# Patient Record
Sex: FEMALE | Race: BLACK OR AFRICAN AMERICAN | NOT HISPANIC OR LATINO | URBAN - METROPOLITAN AREA
[De-identification: names, ages, dates, MRNs, and addresses within clinical notes are randomized per-mention and may not be internally consistent; named-entity substitution may affect disease eponyms.]

---

## 2021-11-15 ENCOUNTER — EMERGENCY (EMERGENCY)
Facility: HOSPITAL | Age: 36
LOS: 1 days | Discharge: HOME | End: 2021-11-15
Attending: EMERGENCY MEDICINE | Admitting: EMERGENCY MEDICINE
Payer: COMMERCIAL

## 2021-11-15 VITALS
OXYGEN SATURATION: 100 % | WEIGHT: 149.91 LBS | HEART RATE: 81 BPM | SYSTOLIC BLOOD PRESSURE: 140 MMHG | TEMPERATURE: 98 F | RESPIRATION RATE: 18 BRPM | HEIGHT: 64 IN | DIASTOLIC BLOOD PRESSURE: 96 MMHG

## 2021-11-15 VITALS
RESPIRATION RATE: 16 BRPM | HEART RATE: 79 BPM | TEMPERATURE: 99 F | DIASTOLIC BLOOD PRESSURE: 88 MMHG | OXYGEN SATURATION: 100 % | SYSTOLIC BLOOD PRESSURE: 141 MMHG

## 2021-11-15 DIAGNOSIS — R51.9 HEADACHE, UNSPECIFIED: ICD-10-CM

## 2021-11-15 DIAGNOSIS — R29.810 FACIAL WEAKNESS: ICD-10-CM

## 2021-11-15 DIAGNOSIS — H53.8 OTHER VISUAL DISTURBANCES: ICD-10-CM

## 2021-11-15 DIAGNOSIS — H40.9 UNSPECIFIED GLAUCOMA: ICD-10-CM

## 2021-11-15 DIAGNOSIS — Z86.73 PERSONAL HISTORY OF TRANSIENT ISCHEMIC ATTACK (TIA), AND CEREBRAL INFARCTION WITHOUT RESIDUAL DEFICITS: ICD-10-CM

## 2021-11-15 DIAGNOSIS — R11.2 NAUSEA WITH VOMITING, UNSPECIFIED: ICD-10-CM

## 2021-11-15 DIAGNOSIS — R20.0 ANESTHESIA OF SKIN: ICD-10-CM

## 2021-11-15 LAB
ALBUMIN SERPL ELPH-MCNC: 4.3 G/DL — SIGNIFICANT CHANGE UP (ref 3.3–5)
ALP SERPL-CCNC: 55 U/L — SIGNIFICANT CHANGE UP (ref 40–120)
ALT FLD-CCNC: 18 U/L — SIGNIFICANT CHANGE UP (ref 10–45)
ANION GAP SERPL CALC-SCNC: 11 MMOL/L — SIGNIFICANT CHANGE UP (ref 5–17)
AST SERPL-CCNC: 17 U/L — SIGNIFICANT CHANGE UP (ref 10–40)
BASOPHILS # BLD AUTO: 0.02 K/UL — SIGNIFICANT CHANGE UP (ref 0–0.2)
BASOPHILS NFR BLD AUTO: 0.5 % — SIGNIFICANT CHANGE UP (ref 0–2)
BILIRUB SERPL-MCNC: <0.2 MG/DL — SIGNIFICANT CHANGE UP (ref 0.2–1.2)
BUN SERPL-MCNC: 7 MG/DL — SIGNIFICANT CHANGE UP (ref 7–23)
CALCIUM SERPL-MCNC: 9.1 MG/DL — SIGNIFICANT CHANGE UP (ref 8.4–10.5)
CHLORIDE SERPL-SCNC: 107 MMOL/L — SIGNIFICANT CHANGE UP (ref 96–108)
CO2 SERPL-SCNC: 26 MMOL/L — SIGNIFICANT CHANGE UP (ref 22–31)
CREAT SERPL-MCNC: 0.76 MG/DL — SIGNIFICANT CHANGE UP (ref 0.5–1.3)
EOSINOPHIL # BLD AUTO: 0.05 K/UL — SIGNIFICANT CHANGE UP (ref 0–0.5)
EOSINOPHIL NFR BLD AUTO: 1.2 % — SIGNIFICANT CHANGE UP (ref 0–6)
GLUCOSE SERPL-MCNC: 88 MG/DL — SIGNIFICANT CHANGE UP (ref 70–99)
HCT VFR BLD CALC: 37.9 % — SIGNIFICANT CHANGE UP (ref 34.5–45)
HGB BLD-MCNC: 12.1 G/DL — SIGNIFICANT CHANGE UP (ref 11.5–15.5)
IMM GRANULOCYTES NFR BLD AUTO: 0.2 % — SIGNIFICANT CHANGE UP (ref 0–1.5)
LYMPHOCYTES # BLD AUTO: 1.47 K/UL — SIGNIFICANT CHANGE UP (ref 1–3.3)
LYMPHOCYTES # BLD AUTO: 34.2 % — SIGNIFICANT CHANGE UP (ref 13–44)
MAGNESIUM SERPL-MCNC: 2 MG/DL — SIGNIFICANT CHANGE UP (ref 1.6–2.6)
MCHC RBC-ENTMCNC: 30.2 PG — SIGNIFICANT CHANGE UP (ref 27–34)
MCHC RBC-ENTMCNC: 31.9 GM/DL — LOW (ref 32–36)
MCV RBC AUTO: 94.5 FL — SIGNIFICANT CHANGE UP (ref 80–100)
MONOCYTES # BLD AUTO: 0.25 K/UL — SIGNIFICANT CHANGE UP (ref 0–0.9)
MONOCYTES NFR BLD AUTO: 5.8 % — SIGNIFICANT CHANGE UP (ref 2–14)
NEUTROPHILS # BLD AUTO: 2.5 K/UL — SIGNIFICANT CHANGE UP (ref 1.8–7.4)
NEUTROPHILS NFR BLD AUTO: 58.1 % — SIGNIFICANT CHANGE UP (ref 43–77)
NRBC # BLD: 0 /100 WBCS — SIGNIFICANT CHANGE UP (ref 0–0)
PLATELET # BLD AUTO: 220 K/UL — SIGNIFICANT CHANGE UP (ref 150–400)
POTASSIUM SERPL-MCNC: 3.8 MMOL/L — SIGNIFICANT CHANGE UP (ref 3.5–5.3)
POTASSIUM SERPL-SCNC: 3.8 MMOL/L — SIGNIFICANT CHANGE UP (ref 3.5–5.3)
PROT SERPL-MCNC: 7.5 G/DL — SIGNIFICANT CHANGE UP (ref 6–8.3)
RBC # BLD: 4.01 M/UL — SIGNIFICANT CHANGE UP (ref 3.8–5.2)
RBC # FLD: 12.9 % — SIGNIFICANT CHANGE UP (ref 10.3–14.5)
SODIUM SERPL-SCNC: 144 MMOL/L — SIGNIFICANT CHANGE UP (ref 135–145)
WBC # BLD: 4.3 K/UL — SIGNIFICANT CHANGE UP (ref 3.8–10.5)
WBC # FLD AUTO: 4.3 K/UL — SIGNIFICANT CHANGE UP (ref 3.8–10.5)

## 2021-11-15 PROCEDURE — 99284 EMERGENCY DEPT VISIT MOD MDM: CPT | Mod: 25

## 2021-11-15 PROCEDURE — 85025 COMPLETE CBC W/AUTO DIFF WBC: CPT

## 2021-11-15 PROCEDURE — 99284 EMERGENCY DEPT VISIT MOD MDM: CPT | Mod: GC

## 2021-11-15 PROCEDURE — 70450 CT HEAD/BRAIN W/O DYE: CPT | Mod: 26,MA

## 2021-11-15 PROCEDURE — 82962 GLUCOSE BLOOD TEST: CPT

## 2021-11-15 PROCEDURE — 83735 ASSAY OF MAGNESIUM: CPT

## 2021-11-15 PROCEDURE — 96374 THER/PROPH/DIAG INJ IV PUSH: CPT

## 2021-11-15 PROCEDURE — 36415 COLL VENOUS BLD VENIPUNCTURE: CPT

## 2021-11-15 PROCEDURE — 99285 EMERGENCY DEPT VISIT HI MDM: CPT

## 2021-11-15 PROCEDURE — 70450 CT HEAD/BRAIN W/O DYE: CPT | Mod: MA

## 2021-11-15 PROCEDURE — 80053 COMPREHEN METABOLIC PANEL: CPT

## 2021-11-15 RX ORDER — KETOROLAC TROMETHAMINE 30 MG/ML
15 SYRINGE (ML) INJECTION ONCE
Refills: 0 | Status: DISCONTINUED | OUTPATIENT
Start: 2021-11-15 | End: 2021-11-15

## 2021-11-15 RX ORDER — KETOROLAC TROMETHAMINE 30 MG/ML
1 SYRINGE (ML) INJECTION
Qty: 20 | Refills: 0
Start: 2021-11-15 | End: 2021-11-19

## 2021-11-15 RX ADMIN — Medication 15 MILLIGRAM(S): at 17:07

## 2021-11-15 NOTE — ED ADULT TRIAGE NOTE - CHIEF COMPLAINT QUOTE
Pt co 40 min of numbness, tingling and R sided facial droop resolving without intervention. Hx of TIA.

## 2021-11-15 NOTE — ED PROVIDER NOTE - OBJECTIVE STATEMENT
37 y/o F PMHx multiple episodes of ?TIA, followed by a neurologist in [place]. Pt had a negative CT head, MR brain, and cerebral angiogram. Describes having an episode of blurry vision to the left eye, drooping to the left side of the face, numbness to the left hand and face, left sided headache that is described as dull and pressure like, and decreased movement of the right side of the mouth that started at 8:30AM today while sitting at work. Reports associated nausea and vomiting. Her symptoms lasted 40 minutes and resolved spontaneously. States Hx similar symptoms with no nausea and vomiting. Pt is currently asymptomatic and denies experiencing focal weakness, slurred speech, gait difficulties, chest pain, and SOB. States that prior her symptoms seemed to be related when she would get her menses, and had an improvement of symptoms after removal of NuvaRing.

## 2021-11-15 NOTE — ED PROVIDER NOTE - PATIENT PORTAL LINK FT
You can access the FollowMyHealth Patient Portal offered by [facility] by registering at the following website: [email or web address]. By joining [facility]’s FollowMyHealth portal, you will also be able to view your health information using other applications (apps) compatible with our system.

## 2021-11-15 NOTE — ED ADULT NURSE NOTE - OBJECTIVE STATEMENT
35yo female c/o facial droop, dizziness, blurry vision , nausea which started at 8am this morning and resolved by time she came to the ED. PT has HX: TIA, A&OX4, RT 18 gauge ACF iv access by EmS. PT currently denies any CP/SOB/DIZZINESS.

## 2021-11-15 NOTE — CONSULT NOTE ADULT - ASSESSMENT
37 y/o F PMHx multiple episodes of ?TIA hx of migraines. prior workup in [place] by multiple neurologist. 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 patient also describes being told about possible conversion disorder. symptoms near complete resolution at this time. patient describes diminished sensation of the L side however all other symptoms have self resolved CT head unremarkable. leading diagnosis likely migraines w complicated features (possible psychosomatic overlie     -would recommend discharge with follow up with Dr Oviedo outpatient after MR head and MR angiogram.  -toradol 15-30mg PRN for HA 35 y/o F PMHx multiple episodes of ?TIA hx of migraines. prior workup in [place] by multiple neurologist. 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 patient also describes being told about possible conversion disorder. symptoms near complete resolution at this time. patient describes diminished sensation of the L side however all other symptoms have self resolved, including nausea, aside from the headache, at 4/10 nearing discharge.   CT head unremarkable.  Leading diagnosis likely migraines w complicated features (possible somatic overlay).       -would recommend discharge with follow up with Dr Coates outpatient after MR head and MR angiogram.  -toradol 15-30mg PRN for HA

## 2021-11-15 NOTE — CONSULT NOTE ADULT - SUBJECTIVE AND OBJECTIVE BOX
Neurology consult    ALEJANDRA [id number]    HPI:37 y/o F PMHx multiple episodes of ?TIA, followed by a neurologist in [place]. Pt had a negative CT head, MR brain, and cerebral angiogram. Describes having an episode of blurry vision to the left eye, drooping to the left side of the face, numbness to the left hand and face, left sided headache that is described as dull and pressure like, and decreased movement of the right side of the mouth that started at 8:30AM today while sitting at work. Reports associated nausea and vomiting. Her symptoms lasted 40 minutes and resolved spontaneously. States Hx similar symptoms with no nausea and vomiting. Pt is currently asymptomatic and denies experiencing focal weakness, slurred speech, gait difficulties, chest pain, and SOB. States that prior her symptoms seemed to be related when she would get her menses, and had an improvement of symptoms after removal of NuvaRing.    Hx of Glaucoma on Latanoprost .005% 1 drop b/l at bedtime           MEDICATIONS    Latanoprost .005% 1 drop b/l at bedtime           Family history: No history of dementia, strokes, or seizures   FAMILY HISTORY:    SOCIAL HISTORY -- No history of tobacco or alcohol use     Allergies    No Known Allergies    Intolerances        Height (cm): 162.6 (11-15 @ 13:06)  Weight (kg): 68 (11-15 @ 13:06)  BMI (kg/m2): 25.7 (11-15 @ 13:06)    Vital Signs Last 24 Hrs  T(C): 37.1 (15 Nov 2021 17:12), Max: 37.1 (15 Nov 2021 17:12)  T(F): 98.7 (15 Nov 2021 17:12), Max: 98.7 (15 Nov 2021 17:12)  HR: 79 (15 Nov 2021 17:12) (79 - 81)  BP: 141/88 (15 Nov 2021 17:12) (140/96 - 141/88)  BP(mean): --  RR: 16 (15 Nov 2021 17:12) (16 - 18)  SpO2: 100% (15 Nov 2021 17:12) (100% - 100%)      REVIEW OF SYSTEMS:    Constitutional: No fever, chills, fatigue, weakness  Eyes: no eye pain, visual disturbances, or discharge  ENT:  No difficulty hearing, tinnitus, vertigo; No sinus or throat pain  Neck: No pain or stiffness  Respiratory: No cough, dyspnea, wheezing   Cardiovascular: No chest pain, palpitations,   Gastrointestinal: No abdominal or epigastric pain. No nausea, vomiting  No diarrhea or constipation.   Genitourinary: No dysuria, frequency, hematuria or incontinence  Neurological: No headaches, lightheadedness, vertigo, numbness or tremors  Psychiatric: No depression, anxiety, mood swings or difficulty sleeping  Musculoskeletal: No joint pain or swelling; No muscle, back or extremity pain  Skin: No itching, burning, rashes or lesions   Lymph Nodes: No enlarged glands  Endocrine: No heat or cold intolerance; No hair loss, No h/o diabetes or thyroid dysfunction  Allergy and Immunologic: No hives or eczema    On Neurological Examination:    Mental Status - Patient is alert, awake, oriented X3. Confused Dementia Lethargic .     Follows commands well and able to answer questions appropriately. Mood and affect  normal  Follow complex commands      Speech -   Fluent                       Cranial Nerves - Pupils 3 mm equal and reactive to light,   extraocular eye movements intact.     Motor Exam - 5/5  Right upper  Left upper  Right lower  Left lower     With stimuli positive movement of all 4 extremities    Muscle tone - is normal all over. No asymmetry is seen.      Sensory    diminished to light touch on L arm, leg and L face v1-v3 distribution    Gait -  normal  ataxia     GENERAL Exam:     Nontoxic , No Acute Distress   	  HEENT:  normocephalic, atraumatic    EXTREMITIES:   No Edema  No Clubbing  No Cyanosis No Edema    MUSCULOSKELETAL: Normal Range of Motion  	   SKIN:      Normal   No Ecchymosis               LABS:  CBC Full  -  ( 15 Nov 2021 13:43 )  WBC Count : 4.30 K/uL  RBC Count : 4.01 M/uL  Hemoglobin : 12.1 g/dL  Hematocrit : 37.9 %  Platelet Count - Automated : 220 K/uL  Mean Cell Volume : 94.5 fl  Mean Cell Hemoglobin : 30.2 pg  Mean Cell Hemoglobin Concentration : 31.9 gm/dL  Auto Neutrophil # : 2.50 K/uL  Auto Lymphocyte # : 1.47 K/uL  Auto Monocyte # : 0.25 K/uL  Auto Eosinophil # : 0.05 K/uL  Auto Basophil # : 0.02 K/uL  Auto Neutrophil % : 58.1 %  Auto Lymphocyte % : 34.2 %  Auto Monocyte % : 5.8 %  Auto Eosinophil % : 1.2 %  Auto Basophil % : 0.5 %      11-15    144  |  107  |  7   ----------------------------<  88  3.8   |  26  |  0.76    Ca    9.1      15 Nov 2021 13:43  Mg     2.0     11-15    TPro  7.5  /  Alb  4.3  /  TBili  <0.2  /  DBili  x   /  AST  17  /  ALT  18  /  AlkPhos  55  11-15    Hemoglobin A1C:     LIVER FUNCTIONS - ( 15 Nov 2021 13:43 )  Alb: 4.3 g/dL / Pro: 7.5 g/dL / ALK PHOS: 55 U/L / ALT: 18 U/L / AST: 17 U/L / GGT: x           Vitamin B12         RADIOLOGY    EKG                     Neurology consult    ALEJANDRA [id number]    HPI:35 y/o F PMHx multiple episodes of ?TIA, followed by a neurologist in [place]. Pt had a negative CT head, MR brain, and cerebral angiogram. Describes having an episode of blurry vision to the left eye, drooping to the left side of the face, numbness to the left hand and face, left sided headache that is described as dull and pressure like, and decreased movement of the right side of the mouth that started at 8:30AM today while sitting at work. Reports associated nausea and vomiting. Her symptoms lasted 40 minutes and resolved spontaneously. States Hx similar symptoms with no nausea and vomiting. Pt is currently improved in terms of headache, from 10/10 to now 4/10 and denies experiencing focal weakness, slurred speech, gait difficulties, chest pain, and SOB.   During her stay here she reported having a period of facial asymmetry/droop lasting about 20minutes.  She took a photo of it, showing intact and symmetric upper and mid-face, symmetric nasolabial folds and creases in the left lower face appearing to show contraction and pulling downward.    States that prior her symptoms seemed to be related when she would get her menses, and had an improvement of symptoms after removal of NuvaRing.    Hx of Glaucoma on Latanoprost .005% 1 drop b/l at bedtime           MEDICATIONS    Latanoprost .005% 1 drop b/l at bedtime           Family history: No history of dementia, strokes, or seizures   FAMILY HISTORY:    SOCIAL HISTORY -- No history of tobacco or alcohol use     Allergies    No Known Allergies    Intolerances        Height (cm): 162.6 (11-15 @ 13:06)  Weight (kg): 68 (11-15 @ 13:06)  BMI (kg/m2): 25.7 (11-15 @ 13:06)    Vital Signs Last 24 Hrs  T(C): 37.1 (15 Nov 2021 17:12), Max: 37.1 (15 Nov 2021 17:12)  T(F): 98.7 (15 Nov 2021 17:12), Max: 98.7 (15 Nov 2021 17:12)  HR: 79 (15 Nov 2021 17:12) (79 - 81)  BP: 141/88 (15 Nov 2021 17:12) (140/96 - 141/88)  BP(mean): --  RR: 16 (15 Nov 2021 17:12) (16 - 18)  SpO2: 100% (15 Nov 2021 17:12) (100% - 100%)      REVIEW OF SYSTEMS:    Constitutional: No fever, chills, fatigue, weakness  Eyes: no eye pain, visual disturbances, or discharge  ENT:  No difficulty hearing, tinnitus, vertigo; No sinus or throat pain  Neck: No pain or stiffness  Respiratory: No cough, dyspnea, wheezing   Cardiovascular: No chest pain, palpitations,   Gastrointestinal: No abdominal or epigastric pain. No nausea, vomiting  No diarrhea or constipation.   Genitourinary: No dysuria, frequency, hematuria or incontinence  Neurological: No headaches, lightheadedness, vertigo, numbness or tremors  Psychiatric: No depression, anxiety, mood swings or difficulty sleeping  Musculoskeletal: No joint pain or swelling; No muscle, back or extremity pain  Skin: No itching, burning, rashes or lesions   Lymph Nodes: No enlarged glands  Endocrine: No heat or cold intolerance; No hair loss, No h/o diabetes or thyroid dysfunction  Allergy and Immunologic: No hives or eczema    On Neurological Examination:    Mental Status - Patient is alert, awake, oriented X3. Confused Dementia Lethargic .     Follows commands well and able to answer questions appropriately. Mood and affect  normal  Follow complex commands      Speech -   Fluent                       Cranial Nerves - Pupils 3 mm equal and reactive to light,   extraocular eye movements intact.     Motor Exam - 5/5  Right upper  Left upper  Right lower  Left lower     With stimuli positive movement of all 4 extremities    Muscle tone - is normal all over. No asymmetry is seen.      Sensory    diminished to light touch on L arm, leg and L face v1-v3 distribution    Gait -  normal  ataxia     GENERAL Exam:     Nontoxic , No Acute Distress   	  HEENT:  normocephalic, atraumatic    EXTREMITIES:   No Edema  No Clubbing  No Cyanosis No Edema    MUSCULOSKELETAL: Normal Range of Motion  	   SKIN:      Normal   No Ecchymosis               LABS:  CBC Full  -  ( 15 Nov 2021 13:43 )  WBC Count : 4.30 K/uL  RBC Count : 4.01 M/uL  Hemoglobin : 12.1 g/dL  Hematocrit : 37.9 %  Platelet Count - Automated : 220 K/uL  Mean Cell Volume : 94.5 fl  Mean Cell Hemoglobin : 30.2 pg  Mean Cell Hemoglobin Concentration : 31.9 gm/dL  Auto Neutrophil # : 2.50 K/uL  Auto Lymphocyte # : 1.47 K/uL  Auto Monocyte # : 0.25 K/uL  Auto Eosinophil # : 0.05 K/uL  Auto Basophil # : 0.02 K/uL  Auto Neutrophil % : 58.1 %  Auto Lymphocyte % : 34.2 %  Auto Monocyte % : 5.8 %  Auto Eosinophil % : 1.2 %  Auto Basophil % : 0.5 %      11-15    144  |  107  |  7   ----------------------------<  88  3.8   |  26  |  0.76    Ca    9.1      15 Nov 2021 13:43  Mg     2.0     11-15    TPro  7.5  /  Alb  4.3  /  TBili  <0.2  /  DBili  x   /  AST  17  /  ALT  18  /  AlkPhos  55  11-15    Hemoglobin A1C:     LIVER FUNCTIONS - ( 15 Nov 2021 13:43 )  Alb: 4.3 g/dL / Pro: 7.5 g/dL / ALK PHOS: 55 U/L / ALT: 18 U/L / AST: 17 U/L / GGT: x           Vitamin B12         RADIOLOGY    EKG

## 2021-11-15 NOTE — ED PROVIDER NOTE - CARE PROVIDER_API CALL
Kvng Coates (MD)  Clinical Neurophysiology; EEGEpilepsy; Neurology; Sleep Medicine  [address], [address] [place] [address], [place] [address]  Phone: [phone]  Fax: [phone]  Follow Up Time:

## 2021-11-15 NOTE — ED PROVIDER NOTE - PHYSICAL EXAMINATION
VITAL SIGNS: I have reviewed nursing notes and confirm.  CONSTITUTIONAL: In no acute distress.   SKIN:  warm and dry, no acute rash.   HEAD:  normocephalic, atraumatic.  EYES: EOM intact; conjunctiva and sclera clear.  ENT: No nasal discharge; airway clear.   NECK: Supple; non tender.  CARD: S1, S2 normal; no murmurs, gallops, or rubs. Regular rate and rhythm.   RESP:  Clear to auscultation b/l, no wheezes, rales or rhonchi.  ABD: Normal bowel sounds; soft; non-distended; non-tender; no guarding/ rebound.  EXT: Normal ROM. No clubbing, cyanosis or edema. 2+ pulses to b/l ue/le.  NEURO: AAOx3, Cranial nerves II-XII grossly intact, no facial droop, no slurred speech, negative pronator drift, motor and sensation intact and equal b/l.   PSYCH: Cooperative, mood and affect appropriate.

## 2021-11-15 NOTE — ED PROVIDER NOTE - CROS ED NEURO POS
blurry vision to left eye, facial droop, decreased movement to right side of mouth, numbness to left hand and face/HEADACHE

## 2021-11-15 NOTE — ED PROVIDER NOTE - NSFOLLOWUPINSTRUCTIONS_ED_ALL_ED_FT
Take toradol as needed for headache (with food).  Follow up with Dr. Coates for re-evaluation and further work up including an MR/ MRA of your brain.  Return to er for any new or worsening symptoms (severe headache, prolonged neurologic symptoms, chest pain, difficulty breathing, passing out...).      [facility]                                                                                                                                                                                                                                                                   Migraine Headache      A migraine headache is a very strong throbbing pain on one side or both sides of your head. This type of headache can also cause other symptoms. It can last from 4 hours to 3 days. Talk with your doctor about what things may bring on (trigger) this condition.      What are the causes?  The exact cause of this condition is not known. This condition may be triggered or caused by:  •Drinking alcohol.      •Smoking.    •Taking medicines, such as:  •Medicine used to treat chest pain (nitroglycerin).      •Birth control pills.      •Estrogen.      •Some blood pressure medicines.        •Eating or drinking certain products.      •Doing physical activity.    Other things that may trigger a migraine headache include:  •Having a menstrual period.      •Pregnancy.      •Hunger.      •Stress.      •Not getting enough sleep or getting too much sleep.      •Weather changes.      •Tiredness (fatigue).        What increases the risk?    •Being 25–55 years old.      •Being female.      •Having a family history of migraine headaches.      •Being .      •Having depression or anxiety.      •Being very overweight.        What are the signs or symptoms?  •A throbbing pain. This pain may:  •Happen in any area of the head, such as on one side or both sides.      •Make it hard to do daily activities.      •Get worse with physical activity.      •Get worse around bright lights or loud noises.      •Other symptoms may include:  •Feeling sick to your stomach (nauseous).      •Vomiting.      •Dizziness.      •Being sensitive to bright lights, loud noises, or smells.      •Before you get a migraine headache, you may get warning signs (an aura). An aura may include:  •Seeing flashing lights or having blind spots.      •Seeing bright spots, halos, or zigzag lines.      •Having tunnel vision or blurred vision.      •Having numbness or a tingling feeling.      •Having trouble talking.      •Having weak muscles.      •Some people have symptoms after a migraine headache (postdromal phase), such as:  •Tiredness.      •Trouble thinking (concentrating).          How is this treated?  •Taking medicines that:  •Relieve pain.      •Relieve the feeling of being sick to your stomach.      •Prevent migraine headaches.      •Treatment may also include:  •Having acupuncture.      •Avoiding foods that bring on migraine headaches.      •Learning ways to control your body functions (biofeedback).      •Therapy to help you know and deal with negative thoughts (cognitive behavioral therapy).          Follow these instructions at home:    Medicines     •Take over-the-counter and prescription medicines only as told by your doctor.    •Ask your doctor if the medicine prescribed to you:  •Requires you to avoid driving or using heavy machinery.    •Can cause trouble pooping (constipation). You may need to take these steps to prevent or treat trouble pooping:  •Drink enough fluid to keep your pee (urine) pale yellow.      •Take over-the-counter or prescription medicines.      •Eat foods that are high in fiber. These include beans, whole grains, and fresh fruits and vegetables.      •Limit foods that are high in fat and sugar. These include fried or sweet foods.          Lifestyle     • Do not drink alcohol.      • Do not use any products that contain nicotine or tobacco, such as cigarettes, e-cigarettes, and chewing tobacco. If you need help quitting, ask your doctor.      •Get at least 8 hours of sleep every night.      •Limit and deal with stress.        General instructions                 •Keep a journal to find out what may bring on your migraine headaches. For example, write down:  •What you eat and drink.      •How much sleep you get.      •Any change in what you eat or drink.      •Any change in your medicines.      •If you have a migraine headache:  •Avoid things that make your symptoms worse, such as bright lights.      •It may help to lie down in a dark, quiet room.      •Do not drive or use heavy machinery.      •Ask your doctor what activities are safe for you.        •Keep all follow-up visits as told by your doctor. This is important.        Contact a doctor if:    •You get a migraine headache that is different or worse than others you have had.      •You have more than 15 headache days in one month.        Get help right away if:    •Your migraine headache gets very bad.      •Your migraine headache lasts longer than 72 hours.      •You have a fever.      •You have a stiff neck.      •You have trouble seeing.      •Your muscles feel weak or like you cannot control them.      •You start to lose your balance a lot.      •You start to have trouble walking.      •You pass out (faint).      •You have a seizure.        Summary    •A migraine headache is a very strong throbbing pain on one side or both sides of your head. These headaches can also cause other symptoms.      •This condition may be treated with medicines and changes to your lifestyle.      •Keep a journal to find out what may bring on your migraine headaches.      •Contact a doctor if you get a migraine headache that is different or worse than others you have had.      •Contact your doctor if you have more than 15 headache days in a month.      This information is not intended to replace advice given to you by your health care provider. Make sure you discuss any questions you have with your health care provider.      Document Revised: 04/10/2020 Document Reviewed: 01/30/2020    Elsevier Patient Education © 2021 [employer]

## 2021-11-15 NOTE — ED PROVIDER NOTE - CLINICAL SUMMARY MEDICAL DECISION MAKING FREE TEXT BOX
Impression: transient episode of blurry vision to the left eye, left sided headache, numbness to left face and left hand, left facial droop. Symptoms now resolved, neuro exam nonfocal. Do not suspect acute CVA. Symptoms may be ?secondary to complex migraine. Will check labs, obtain CT head, contact Pt's neurologist in [place] to gain collateral information. Impression: transient episode of blurry vision to the left eye, left sided headache, numbness to left face and left hand, left facial droop. Symptoms now resolved, neuro exam nonfocal. Do not suspect acute CVA. Symptoms may be ?secondary to complex migraine. Will check labs, obtain CT head, contact Pt's neurologist in [place] to gain collateral information.    Spoke w/ RN for pt's neurologist in [place] (Dr. Fang [phone]). Pt has had multiple episodes of dizziness, facial paresthesias, blurry vision to left eye. Had neg ct head in 9/20 and 3/21; cta h/n on 1/14/20 showed ? angiogram; cerebral angiogram showed small vertebral and basilar arteries, ? vertebrobasilar insufficiency, no aneurysm. Labs and ct pending. Will continue to monitor. Impression: transient episode of blurry vision to the left eye with left sided headache, numbness to left face and left hand, left facial droop. Symptoms now resolved, neuro exam nonfocal. Do not suspect acute CVA. Symptoms may be ?secondary to complex migraine. Will check labs, obtain CT head, contact Pt's neurologist in [place] to gain collateral information.    Spoke w/ RN for pt's neurologist in [place] (Dr. Fang [phone]). Pt has had multiple episodes of dizziness, facial paresthesias, blurry vision to left eye. Had neg ct head in 9/20 and 3/21; cta h/n on 1/14/20 showed ? angiogram; cerebral angiogram showed small vertebral and basilar arteries, ? vertebrobasilar insufficiency, no aneurysm. Labs and ct pending. Will continue to monitor.    CT head neg for acute bleed/ infarct. Labs reassuring with normal wbc, hg/hct, electrolytes, renal function. Pt had brief 20 min episode of sx's while in ed. Neuro consulted and pt evaluated by Dr. Coates. Sx's likely related complex migraine w/ somatization of sx's. Pt stable for outpt w/u with Dr. Coates. ED evaluation and management discussed with the patient in detail.  Close neuro follow up encouraged.  Strict ED return instructions discussed in detail and patient given the opportunity to ask any questions about their discharge diagnosis and instructions. Patient verbalized understanding.

## 2021-11-15 NOTE — ED ADULT TRIAGE NOTE - INTERNATIONAL TRAVEL
----- Message from Esther Olmos sent at 11/1/2019  1:00 PM CDT -----  Contact: Patient [phone]  Prescription Request:     Name of medication: ACCU-CHEK SMARTVIEW TEST STRIP Strp    Reason for request: Refill    Pharmacy: [facility] - [facility] [address]    Patient is OUT.    Thank You     No

## 2021-11-16 DIAGNOSIS — G45.0 VERTEBRO-BASILAR ARTERY SYNDROME: ICD-10-CM

## 2021-11-16 PROBLEM — Z00.00 ENCOUNTER FOR PREVENTIVE HEALTH EXAMINATION: Status: ACTIVE | Noted: 2021-11-16

## 2021-11-22 PROBLEM — Z86.73 PERSONAL HISTORY OF TRANSIENT ISCHEMIC ATTACK (TIA), AND CEREBRAL INFARCTION WITHOUT RESIDUAL DEFICITS: Chronic | Status: ACTIVE | Noted: 2021-11-15

## 2021-11-30 ENCOUNTER — RESULT REVIEW (OUTPATIENT)
Age: 36
End: 2021-11-30

## 2021-11-30 ENCOUNTER — APPOINTMENT (OUTPATIENT)
Dept: MRI IMAGING | Facility: CLINIC | Age: 36
End: 2021-11-30
Payer: COMMERCIAL

## 2021-11-30 ENCOUNTER — OUTPATIENT (OUTPATIENT)
Dept: OUTPATIENT SERVICES | Facility: HOSPITAL | Age: 36
LOS: 1 days | End: 2021-11-30

## 2021-11-30 PROCEDURE — 70551 MRI BRAIN STEM W/O DYE: CPT | Mod: 26

## 2021-11-30 PROCEDURE — 70544 MR ANGIOGRAPHY HEAD W/O DYE: CPT | Mod: 26,59

## 2021-12-06 ENCOUNTER — NON-APPOINTMENT (OUTPATIENT)
Age: 36
End: 2021-12-06

## 2021-12-16 ENCOUNTER — LABORATORY RESULT (OUTPATIENT)
Age: 36
End: 2021-12-16

## 2021-12-16 ENCOUNTER — NON-APPOINTMENT (OUTPATIENT)
Age: 36
End: 2021-12-16

## 2021-12-16 ENCOUNTER — APPOINTMENT (OUTPATIENT)
Dept: NEUROLOGY | Facility: CLINIC | Age: 36
End: 2021-12-16
Payer: COMMERCIAL

## 2021-12-16 VITALS
WEIGHT: 147 LBS | BODY MASS INDEX: 27.05 KG/M2 | HEIGHT: 62 IN | TEMPERATURE: 98.2 F | OXYGEN SATURATION: 97 % | RESPIRATION RATE: 16 BRPM | HEART RATE: 80 BPM | SYSTOLIC BLOOD PRESSURE: 129 MMHG | DIASTOLIC BLOOD PRESSURE: 84 MMHG

## 2021-12-16 DIAGNOSIS — R20.2 PARESTHESIA OF SKIN: ICD-10-CM

## 2021-12-16 DIAGNOSIS — G81.90 HEMIPLEGIA, UNSPECIFIED AFFECTING UNSPECIFIED SIDE: ICD-10-CM

## 2021-12-16 DIAGNOSIS — R20.0 ANESTHESIA OF SKIN: ICD-10-CM

## 2021-12-16 PROCEDURE — 99214 OFFICE O/P EST MOD 30 MIN: CPT

## 2021-12-16 RX ORDER — AZELASTINE HYDROCHLORIDE 137 UG/1
137 SPRAY, METERED NASAL
Refills: 0 | Status: COMPLETED | COMMUNITY
Start: 1900-01-01 | End: 2021-12-16

## 2021-12-16 RX ORDER — NEOMYCIN SULFATE, POLYMYXIN B SULFATE AND HYDROCORTISONE 3.5; 10000; 1 MG/ML; [IU]/ML; MG/ML
SOLUTION AURICULAR (OTIC)
Refills: 0 | Status: COMPLETED | COMMUNITY
Start: 1900-01-01 | End: 2021-12-16

## 2021-12-16 RX ORDER — FLUCONAZOLE 150 MG/1
150 TABLET ORAL
Refills: 0 | Status: COMPLETED | COMMUNITY
Start: 1900-01-01 | End: 2021-12-16

## 2021-12-16 RX ORDER — LIDOCAINE HYDROCHLORIDE 15 MG/ML
INJECTION, SOLUTION EPIDURAL; INFILTRATION; INTRACAUDAL; PERINEURAL
Refills: 0 | Status: COMPLETED | COMMUNITY
Start: 1900-01-01 | End: 2021-12-16

## 2021-12-16 RX ORDER — AMOXICILLIN 400 MG/5ML
400 FOR SUSPENSION ORAL
Refills: 0 | Status: COMPLETED | COMMUNITY
Start: 1900-01-01 | End: 2021-12-16

## 2021-12-16 RX ORDER — METHYLPREDNISOLONE 4 MG/1
4 TABLET ORAL
Refills: 0 | Status: COMPLETED | COMMUNITY
Start: 1900-01-01 | End: 2021-12-16

## 2021-12-16 RX ORDER — AMOXICILLIN 875 MG/1
875 TABLET, FILM COATED ORAL
Refills: 0 | Status: COMPLETED | COMMUNITY
Start: 1900-01-01 | End: 2021-12-16

## 2021-12-16 RX ORDER — PREDNISONE 20 MG/1
20 TABLET ORAL
Refills: 0 | Status: COMPLETED | COMMUNITY
Start: 1900-01-01 | End: 2021-12-16

## 2021-12-16 RX ORDER — NAPROXEN 500 MG/1
500 TABLET ORAL
Refills: 0 | Status: COMPLETED | COMMUNITY
Start: 1900-01-01 | End: 2021-12-16

## 2021-12-17 DIAGNOSIS — Z78.9 OTHER SPECIFIED HEALTH STATUS: ICD-10-CM

## 2021-12-17 DIAGNOSIS — Z80.9 FAMILY HISTORY OF MALIGNANT NEOPLASM, UNSPECIFIED: ICD-10-CM

## 2021-12-17 DIAGNOSIS — H53.8 OTHER VISUAL DISTURBANCES: ICD-10-CM

## 2021-12-17 DIAGNOSIS — Z87.898 PERSONAL HISTORY OF OTHER SPECIFIED CONDITIONS: ICD-10-CM

## 2021-12-17 DIAGNOSIS — R53.1 WEAKNESS: ICD-10-CM

## 2021-12-17 DIAGNOSIS — Z82.3 FAMILY HISTORY OF STROKE: ICD-10-CM

## 2021-12-17 DIAGNOSIS — R20.2 PARESTHESIA OF SKIN: ICD-10-CM

## 2021-12-17 LAB
FOLATE SERPL-MCNC: 6.6 NG/ML
INR PPP: 1.04 RATIO
PT BLD: 12.3 SEC
VIT B12 SERPL-MCNC: 1128 PG/ML

## 2021-12-17 NOTE — ASSESSMENT
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 She has been extensively worked up for stroke in the past and her workups have been consistently negative. 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 The episodic nature of these frequent events and the fact that occur around menses are  features typical of migraines. My goal is to treat her migraines- first by evaluating her responsiveness to a migraine specific medication/ CGRP which she has never been on previously. Advised her to take the ubrelvy at the start of her headaches, which for now only occur abut 3 times per month. Advised her to monitor her symptoms and to start to keep a log which can help us better understand her headache patterns. This should include details such as alleviating and precipitating factors (nelli responsiveness to the ubrelvy), associated symptoms, frequency and severity, etc. Depending on her responsiveness to Ubrelvy and results of her headache diary, will consider migraine ppx. Advised her to avoid OTC ie Tylenol/ Motrin overuse as this can cause rebound headaches. Will also get eeg to evaluate for hemisensory seizures, although my suspicion for this is low. As far as her facial droop- not classic pattern of facial paralysis seen in stroke- nasolabial folds intact b/l, contraction noted on L side- appears more like a pulling of left side of face- conversion d/o?? However given her multiple admissions for stroke syndromes, would like to get coag testing to consider maybe a baby aspirin given multiple stroke-like events. Down the road can consider DUANE to complete her workup for stroke (although again, my suspicion for past stroke is very low). \par \par RTO 1 month, with attending \par

## 2021-12-17 NOTE — PHYSICAL EXAM
[FreeTextEntry1] : The patient is alert and oriented x3, naming intact x3, repetition normal, follows three-step commands, and is able to participate fully in the history taking.\par Speech is normal with no evidence of dysarthria.\par Memory is intact: Immediate recall 3 out of 3, short-term 3 out of 3, remote memory intact\par Cranial nerves II through XII intact\par Motor exam: Upper and lower extremities 5 out of 5 power, normal tone. No abnormal movements noted.\par Sensory exam: Left V1 70%, LV2 30%, LV3 30%, LUE 80% LLE 80% sensation when compared to the right which is at full sensation. Romberg negative. Vibratory sense present but reduced throughout on the left. Proprioception, temperature, sharp/dull intact, all reportedly diminished on L when compared to the right. \par Coordination and vestibular exam: Finger to nose intact, no evidence of truncal or appendicular ataxia. No evidence of nystagmus. no vestibular symptoms elicited with head turning during ambulation.\par Gait: Normal stance and gait.\par Reflexes: One to 2+ in upper and lower extremities. No pathological reflexes. Downgoing toes.\par  \par \par \par

## 2021-12-17 NOTE — HISTORY OF PRESENT ILLNESS
[FreeTextEntry1] : Pt is a 35 yo F PMHx multiple TIA(???) episodes in [place], here for ER follow-up visit. She initially came to the ER complaining of episode of blurry vision to the left eye, drooping to the left side of the face, numbness to the left hand and face, left sided headache that is described as dull and pressure like, all of which started earlier that morning while sitting at work. Reports associated nausea and vomiting. Her symptoms lasted 40 minutes and resolved spontaneously. States multiple hx of similar symptoms with no nausea and vomiting. During her stay here she reported having a period of facial asymmetry/ droop lasting about 20 minutes. She took a photo of it, showing intact and symmetric upper and mid-face, symmetric nasolabial folds and creases in the left lower face appearing to show contraction and pulling downward. She was d/c home from ER and outpatient MR head and MRA were arranged. MRI head was negative, MRA showed small vertebrobasilar system but otherwise a normal MRA.\par \par She says she previously lived in [place] and had multiple hospital admissions for similar symptoms. She had multiple head CTs, MRIs, angiograms all of which were negative for acute stroke/ other abnormality to explain her symptoms. She says these episodes all seem to have occurred around the time of her menses. \par \par She  occasional gets headaches- about 3x per month, but these are relatively mild and much less profound than her other symptoms of L arm and L leg weakness, L whole body numbness. Also gets this associated facial droop with feeling of drooling when this happens. Was previously on nortiptyline in [place], with no change/ improvement. Since she was in the ER she reports more persistent L sided numbness and more frequent headaches. \par \par \par \par \par

## 2021-12-19 LAB
AT III PPP CHRO-ACNC: 98 %
CARDIOLIPIN IGM SER-MCNC: 9.7 MPL
CARDIOLIPIN IGM SER-MCNC: <5 GPL
DEPRECATED CARDIOLIPIN IGA SER: <5 APL

## 2021-12-20 LAB — PTR INTERP: NORMAL

## 2021-12-21 LAB
B2 GLYCOPROT1 AB SER QL: NEGATIVE
CARDIOLIPIN AB SER IA-ACNC: NEGATIVE

## 2021-12-21 RX ORDER — UBROGEPANT 100 MG/1
100 TABLET ORAL
Qty: 10 | Refills: 3 | Status: ACTIVE | COMMUNITY
Start: 2021-12-16 | End: 1900-01-01

## 2021-12-22 LAB
HOMOCYSTEINE LEVEL: 11 UMOL/L
METHYLMALONIC ACID LEVEL: <50 NMOL/L

## 2022-02-07 ENCOUNTER — APPOINTMENT (OUTPATIENT)
Dept: NEUROLOGY | Facility: CLINIC | Age: 37
End: 2022-02-07
Payer: COMMERCIAL

## 2022-02-07 PROCEDURE — 95819 EEG AWAKE AND ASLEEP: CPT

## 2022-02-08 ENCOUNTER — APPOINTMENT (OUTPATIENT)
Dept: NEUROLOGY | Facility: CLINIC | Age: 37
End: 2022-02-08

## 2022-02-08 PROCEDURE — 95700 EEG CONT REC W/VID EEG TECH: CPT

## 2022-02-08 PROCEDURE — 95719 EEG PHYS/QHP EA INCR W/O VID: CPT

## 2022-02-08 PROCEDURE — 95708 EEG WO VID EA 12-26HR UNMNTR: CPT

## 2022-02-09 ENCOUNTER — NON-APPOINTMENT (OUTPATIENT)
Age: 37
End: 2022-02-09

## 2022-02-18 ENCOUNTER — APPOINTMENT (OUTPATIENT)
Dept: NEUROLOGY | Facility: CLINIC | Age: 37
End: 2022-02-18

## 2022-03-18 ENCOUNTER — APPOINTMENT (OUTPATIENT)
Dept: NEUROLOGY | Facility: CLINIC | Age: 37
End: 2022-03-18

## 2022-10-27 NOTE — CONSULT NOTE ADULT - ATTENDING COMMENTS
39 yo woman presenting with headache and nausea with symptoms of left face/arm and leg sensory change and facial droop.  Used 60mg alleve without benefit this AM.    The nausea is relatively recent, though several years ago had a similar presentation, has not had many events since.  Migraine with an aura is a diagnosis of exclusion, but does fit given the perimenstrual pattern, and nausea and possible somatic aura.  Facial asymmetry/droop photo today appeared non-organic for a droop, as muscle contractions visible on the left.    No MRI brain since 2015 per her mychart.  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    Plan:  1) outpatient MRI and MRA head.  Consider NOVA.  2) f/u following to consider migraine treatment if no other etiology to consider at that time.  3) continue with NSAID treatment, such as toradol in short-term. negative...

## 2023-11-29 ENCOUNTER — EMERGENCY (EMERGENCY)
Facility: HOSPITAL | Age: 38
LOS: 1 days | Discharge: HOME | End: 2023-11-29
Attending: EMERGENCY MEDICINE | Admitting: EMERGENCY MEDICINE
Payer: COMMERCIAL

## 2023-11-29 VITALS
DIASTOLIC BLOOD PRESSURE: 96 MMHG | HEART RATE: 81 BPM | OXYGEN SATURATION: 98 % | RESPIRATION RATE: 20 BRPM | SYSTOLIC BLOOD PRESSURE: 167 MMHG | TEMPERATURE: 98 F

## 2023-11-29 VITALS
HEART RATE: 86 BPM | HEIGHT: 61 IN | SYSTOLIC BLOOD PRESSURE: 160 MMHG | OXYGEN SATURATION: 99 % | TEMPERATURE: 98 F | WEIGHT: 163.36 LBS | RESPIRATION RATE: 20 BRPM | DIASTOLIC BLOOD PRESSURE: 101 MMHG

## 2023-11-29 DIAGNOSIS — G43.109 MIGRAINE WITH AURA, NOT INTRACTABLE, WITHOUT STATUS MIGRAINOSUS: ICD-10-CM

## 2023-11-29 DIAGNOSIS — Z86.69 PERSONAL HISTORY OF OTHER DISEASES OF THE NERVOUS SYSTEM AND SENSE ORGANS: ICD-10-CM

## 2023-11-29 DIAGNOSIS — G43.909 MIGRAINE, UNSPECIFIED, NOT INTRACTABLE, WITHOUT STATUS MIGRAINOSUS: ICD-10-CM

## 2023-11-29 LAB
ANION GAP SERPL CALC-SCNC: 7 MMOL/L — SIGNIFICANT CHANGE UP (ref 5–17)
ANION GAP SERPL CALC-SCNC: 7 MMOL/L — SIGNIFICANT CHANGE UP (ref 5–17)
BASOPHILS # BLD AUTO: 0.02 K/UL — SIGNIFICANT CHANGE UP (ref 0–0.2)
BASOPHILS # BLD AUTO: 0.02 K/UL — SIGNIFICANT CHANGE UP (ref 0–0.2)
BASOPHILS NFR BLD AUTO: 0.6 % — SIGNIFICANT CHANGE UP (ref 0–2)
BASOPHILS NFR BLD AUTO: 0.6 % — SIGNIFICANT CHANGE UP (ref 0–2)
BUN SERPL-MCNC: 9 MG/DL — SIGNIFICANT CHANGE UP (ref 7–23)
BUN SERPL-MCNC: 9 MG/DL — SIGNIFICANT CHANGE UP (ref 7–23)
CALCIUM SERPL-MCNC: 8.8 MG/DL — SIGNIFICANT CHANGE UP (ref 8.4–10.5)
CALCIUM SERPL-MCNC: 8.8 MG/DL — SIGNIFICANT CHANGE UP (ref 8.4–10.5)
CHLORIDE SERPL-SCNC: 106 MMOL/L — SIGNIFICANT CHANGE UP (ref 96–108)
CHLORIDE SERPL-SCNC: 106 MMOL/L — SIGNIFICANT CHANGE UP (ref 96–108)
CO2 SERPL-SCNC: 26 MMOL/L — SIGNIFICANT CHANGE UP (ref 22–31)
CO2 SERPL-SCNC: 26 MMOL/L — SIGNIFICANT CHANGE UP (ref 22–31)
CREAT SERPL-MCNC: 0.57 MG/DL — SIGNIFICANT CHANGE UP (ref 0.5–1.3)
CREAT SERPL-MCNC: 0.57 MG/DL — SIGNIFICANT CHANGE UP (ref 0.5–1.3)
EGFR: 121 ML/MIN/1.73M2 — SIGNIFICANT CHANGE UP
EGFR: 121 ML/MIN/1.73M2 — SIGNIFICANT CHANGE UP
EOSINOPHIL # BLD AUTO: 0.08 K/UL — SIGNIFICANT CHANGE UP (ref 0–0.5)
EOSINOPHIL # BLD AUTO: 0.08 K/UL — SIGNIFICANT CHANGE UP (ref 0–0.5)
EOSINOPHIL NFR BLD AUTO: 2.2 % — SIGNIFICANT CHANGE UP (ref 0–6)
EOSINOPHIL NFR BLD AUTO: 2.2 % — SIGNIFICANT CHANGE UP (ref 0–6)
GLUCOSE SERPL-MCNC: 92 MG/DL — SIGNIFICANT CHANGE UP (ref 70–99)
GLUCOSE SERPL-MCNC: 92 MG/DL — SIGNIFICANT CHANGE UP (ref 70–99)
HCG SERPL-ACNC: <0 MIU/ML — SIGNIFICANT CHANGE UP
HCG SERPL-ACNC: <0 MIU/ML — SIGNIFICANT CHANGE UP
HCT VFR BLD CALC: 40.8 % — SIGNIFICANT CHANGE UP (ref 34.5–45)
HCT VFR BLD CALC: 40.8 % — SIGNIFICANT CHANGE UP (ref 34.5–45)
HGB BLD-MCNC: 13.2 G/DL — SIGNIFICANT CHANGE UP (ref 11.5–15.5)
HGB BLD-MCNC: 13.2 G/DL — SIGNIFICANT CHANGE UP (ref 11.5–15.5)
IMM GRANULOCYTES NFR BLD AUTO: 0.3 % — SIGNIFICANT CHANGE UP (ref 0–0.9)
IMM GRANULOCYTES NFR BLD AUTO: 0.3 % — SIGNIFICANT CHANGE UP (ref 0–0.9)
LYMPHOCYTES # BLD AUTO: 1.61 K/UL — SIGNIFICANT CHANGE UP (ref 1–3.3)
LYMPHOCYTES # BLD AUTO: 1.61 K/UL — SIGNIFICANT CHANGE UP (ref 1–3.3)
LYMPHOCYTES # BLD AUTO: 45 % — HIGH (ref 13–44)
LYMPHOCYTES # BLD AUTO: 45 % — HIGH (ref 13–44)
MCHC RBC-ENTMCNC: 30.6 PG — SIGNIFICANT CHANGE UP (ref 27–34)
MCHC RBC-ENTMCNC: 30.6 PG — SIGNIFICANT CHANGE UP (ref 27–34)
MCHC RBC-ENTMCNC: 32.4 GM/DL — SIGNIFICANT CHANGE UP (ref 32–36)
MCHC RBC-ENTMCNC: 32.4 GM/DL — SIGNIFICANT CHANGE UP (ref 32–36)
MCV RBC AUTO: 94.4 FL — SIGNIFICANT CHANGE UP (ref 80–100)
MCV RBC AUTO: 94.4 FL — SIGNIFICANT CHANGE UP (ref 80–100)
MONOCYTES # BLD AUTO: 0.16 K/UL — SIGNIFICANT CHANGE UP (ref 0–0.9)
MONOCYTES # BLD AUTO: 0.16 K/UL — SIGNIFICANT CHANGE UP (ref 0–0.9)
MONOCYTES NFR BLD AUTO: 4.5 % — SIGNIFICANT CHANGE UP (ref 2–14)
MONOCYTES NFR BLD AUTO: 4.5 % — SIGNIFICANT CHANGE UP (ref 2–14)
NEUTROPHILS # BLD AUTO: 1.7 K/UL — LOW (ref 1.8–7.4)
NEUTROPHILS # BLD AUTO: 1.7 K/UL — LOW (ref 1.8–7.4)
NEUTROPHILS NFR BLD AUTO: 47.4 % — SIGNIFICANT CHANGE UP (ref 43–77)
NEUTROPHILS NFR BLD AUTO: 47.4 % — SIGNIFICANT CHANGE UP (ref 43–77)
NRBC # BLD: 0 /100 WBCS — SIGNIFICANT CHANGE UP (ref 0–0)
NRBC # BLD: 0 /100 WBCS — SIGNIFICANT CHANGE UP (ref 0–0)
PLATELET # BLD AUTO: 195 K/UL — SIGNIFICANT CHANGE UP (ref 150–400)
PLATELET # BLD AUTO: 195 K/UL — SIGNIFICANT CHANGE UP (ref 150–400)
POTASSIUM SERPL-MCNC: 3.5 MMOL/L — SIGNIFICANT CHANGE UP (ref 3.5–5.3)
POTASSIUM SERPL-MCNC: 3.5 MMOL/L — SIGNIFICANT CHANGE UP (ref 3.5–5.3)
POTASSIUM SERPL-SCNC: 3.5 MMOL/L — SIGNIFICANT CHANGE UP (ref 3.5–5.3)
POTASSIUM SERPL-SCNC: 3.5 MMOL/L — SIGNIFICANT CHANGE UP (ref 3.5–5.3)
RBC # BLD: 4.32 M/UL — SIGNIFICANT CHANGE UP (ref 3.8–5.2)
RBC # BLD: 4.32 M/UL — SIGNIFICANT CHANGE UP (ref 3.8–5.2)
RBC # FLD: 12.3 % — SIGNIFICANT CHANGE UP (ref 10.3–14.5)
RBC # FLD: 12.3 % — SIGNIFICANT CHANGE UP (ref 10.3–14.5)
SODIUM SERPL-SCNC: 139 MMOL/L — SIGNIFICANT CHANGE UP (ref 135–145)
SODIUM SERPL-SCNC: 139 MMOL/L — SIGNIFICANT CHANGE UP (ref 135–145)
WBC # BLD: 3.58 K/UL — LOW (ref 3.8–10.5)
WBC # BLD: 3.58 K/UL — LOW (ref 3.8–10.5)
WBC # FLD AUTO: 3.58 K/UL — LOW (ref 3.8–10.5)
WBC # FLD AUTO: 3.58 K/UL — LOW (ref 3.8–10.5)

## 2023-11-29 PROCEDURE — 82962 GLUCOSE BLOOD TEST: CPT

## 2023-11-29 PROCEDURE — 99285 EMERGENCY DEPT VISIT HI MDM: CPT

## 2023-11-29 PROCEDURE — 70498 CT ANGIOGRAPHY NECK: CPT | Mod: MC

## 2023-11-29 PROCEDURE — 85025 COMPLETE CBC W/AUTO DIFF WBC: CPT

## 2023-11-29 PROCEDURE — 36415 COLL VENOUS BLD VENIPUNCTURE: CPT

## 2023-11-29 PROCEDURE — 70498 CT ANGIOGRAPHY NECK: CPT | Mod: 26,MC

## 2023-11-29 PROCEDURE — 96374 THER/PROPH/DIAG INJ IV PUSH: CPT | Mod: XU

## 2023-11-29 PROCEDURE — 70450 CT HEAD/BRAIN W/O DYE: CPT | Mod: MC

## 2023-11-29 PROCEDURE — 99284 EMERGENCY DEPT VISIT MOD MDM: CPT | Mod: 25

## 2023-11-29 PROCEDURE — 70450 CT HEAD/BRAIN W/O DYE: CPT | Mod: 26,MC,59

## 2023-11-29 PROCEDURE — 70496 CT ANGIOGRAPHY HEAD: CPT | Mod: MC

## 2023-11-29 PROCEDURE — 96375 TX/PRO/DX INJ NEW DRUG ADDON: CPT | Mod: XU

## 2023-11-29 PROCEDURE — 70496 CT ANGIOGRAPHY HEAD: CPT | Mod: 26,MC

## 2023-11-29 PROCEDURE — 80048 BASIC METABOLIC PNL TOTAL CA: CPT

## 2023-11-29 PROCEDURE — 84702 CHORIONIC GONADOTROPIN TEST: CPT

## 2023-11-29 RX ORDER — METOCLOPRAMIDE HCL 10 MG
10 TABLET ORAL ONCE
Refills: 0 | Status: COMPLETED | OUTPATIENT
Start: 2023-11-29 | End: 2023-11-29

## 2023-11-29 RX ORDER — AMLODIPINE BESYLATE 2.5 MG/1
1 TABLET ORAL
Qty: 30 | Refills: 0
Start: 2023-11-29 | End: 2023-12-28

## 2023-11-29 RX ORDER — KETOROLAC TROMETHAMINE 30 MG/ML
15 SYRINGE (ML) INJECTION ONCE
Refills: 0 | Status: DISCONTINUED | OUTPATIENT
Start: 2023-11-29 | End: 2023-11-29

## 2023-11-29 RX ORDER — SODIUM CHLORIDE 9 MG/ML
1000 INJECTION INTRAMUSCULAR; INTRAVENOUS; SUBCUTANEOUS ONCE
Refills: 0 | Status: COMPLETED | OUTPATIENT
Start: 2023-11-29 | End: 2023-11-29

## 2023-11-29 RX ADMIN — SODIUM CHLORIDE 1000 MILLILITER(S): 9 INJECTION INTRAMUSCULAR; INTRAVENOUS; SUBCUTANEOUS at 10:47

## 2023-11-29 RX ADMIN — Medication 15 MILLIGRAM(S): at 10:47

## 2023-11-29 RX ADMIN — Medication 15 MILLIGRAM(S): at 11:17

## 2023-11-29 RX ADMIN — Medication 10 MILLIGRAM(S): at 10:47

## 2023-11-29 NOTE — ED PROVIDER NOTE - CLINICAL SUMMARY MEDICAL DECISION MAKING FREE TEXT BOX
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 The rest of the CTA of the neck and [place]   appears unremarkable    Pt bp 160/90 - will start pt on low dose bp med as outpt.  Pt instructed to take daily bp log and follow up with PCP and neurologist as soon as possible.  Understands discharge instructions and follow up.

## 2023-11-29 NOTE — ED ADULT NURSE NOTE - CHIEF COMPLAINT QUOTE
Pt presents to ED c/o tingling on L side of the face and fingers that hasn't resolved and feeling dizzy w/ blurred vision and pt also noticed facial droop L last night at 8pm now has resolved. Pt A&Ox4, conversive in full sentences. Pt has hx of migraines been seeing her neurologist. Denies cp or sob. FS 93.

## 2023-11-29 NOTE — ED ADULT TRIAGE NOTE - CHIEF COMPLAINT QUOTE
Pt presents to ED c/o tingling and feeling dizzy and pt also noticed facial droop L last night at 8pm now has resolved. Pt A&Ox4, conversive in full sentences still complaining of tingling on the L side fo the face and fingers. Pt has hx of migraines has been seeing neurologist. Denies cp or sob. FS 93. Pt presents to ED c/o tingling on L side of the face and fingers that hasn't resolved and feeling dizzy w/ blurred vision and pt also noticed facial droop L last night at 8pm now has resolved. Pt A&Ox4, conversive in full sentences. Pt has hx of migraines been seeing her neurologist. Denies cp or sob. FS 93.

## 2023-11-29 NOTE — ED PROVIDER NOTE - OBJECTIVE STATEMENT
37 y/o f with prior hx of complex migraines seen by neurology in past with extensive workup including MRIs and CT - no evidence of prior stroke on imaging.  Treated prophylactically with meds at some point but no longer on medication.  Pt presents to ED with typical prior migraine of left arm numbness and left facial droop which she has happened in past along with scotomas - followed by migraine headache.  Pt took ibu and Tylenol at home with minimal relief.  Currently has new neurologist but has not yet scheduled appt.  Pt also states that she had visit with gyn one week prior and BP was noted to be high.

## 2023-11-29 NOTE — ED PROVIDER NOTE - PATIENT PORTAL LINK FT
You can access the FollowMyHealth Patient Portal offered by [facility] by registering at the following website: [email or web address]. By joining [employer]’s FollowMyHealth portal, you will also be able to view your health information using other applications (apps) compatible with our system.

## 2023-11-29 NOTE — ED PROVIDER NOTE - NEUROLOGICAL, MLM
Alert & Oriented x 3. CN II-XII intact. No facial droop. Clear speech. JACKSON w/ 5/5 strength x 4 ext. Normal sensation. No pronator drift. No dysdidokinesia nor dysmetria. Normal heel-to-shin.

## 2023-11-29 NOTE — ED ADULT NURSE NOTE - OBJECTIVE STATEMENT
38 y.o. female a/ox4 c/o dizziness, severe headache, and L side of face and L fingers numbness/tingling, with brief blurry vision this AM. Pt states she had full L side numbness/tingling yesterday which mostly improved a part from her remaining symptoms previously stated that began in the evening. speaking in clear full sentences. Pt has hx of migraines been seeing her neurologist and these migraines do cause numbness/tingling L side. Mariano CP, SOB, current blurry vision, weakness, syncope, nausea, vomiting, fevers/chills.

## 2023-11-29 NOTE — ED PROVIDER NOTE - NSFOLLOWUPINSTRUCTIONS_ED_ALL_ED_FT
Take Fioricet as needed if headache reoccurs.  Follow up with your neurologist as outpatient.  Return to ED with worsening symptoms or other concerns.    Migraine Headache  A migraine headache is an intense, throbbing pain on one side or both sides of the head. Migraine headaches may also cause other symptoms, such as nausea, vomiting, and sensitivity to light and noise. A migraine headache can last from 4 hours to 3 days. Talk with your doctor about what things may bring on (trigger) your migraine headaches.    What are the causes?  The exact cause of this condition is not known. However, a migraine may be caused when nerves in the brain become irritated and release chemicals that cause inflammation of blood vessels. This inflammation causes pain. This condition may be triggered or caused by:  Drinking alcohol.  Smoking.  Taking medicines, such as:  Medicine used to treat chest pain (nitroglycerin).  Birth control pills.  Estrogen.  Certain blood pressure medicines.  Eating or drinking products that contain nitrates, glutamate, aspartame, or tyramine. Aged cheeses, chocolate, or caffeine may also be triggers.  Doing physical activity.  Other things that may trigger a migraine headache include:  Menstruation.  Pregnancy.  Hunger.  Stress.  Lack of sleep or too much sleep.  Weather changes.  Fatigue.  What increases the risk?  The following factors may make you more likely to experience migraine headaches:  Being a certain age. This condition is more common in people who are 25–55 years old.  Being female.  Having a family history of migraine headaches.  Being .  Having a mental health condition, such as depression or anxiety.  Being obese.  What are the signs or symptoms?  The main symptom of this condition is pulsating or throbbing pain. This pain may:  Happen in any area of the head, such as on one side or both sides.  Interfere with daily activities.  Get worse with physical activity.  Get worse with exposure to bright lights or loud noises.  Other symptoms may include:  Nausea.  Vomiting.  Dizziness.  General sensitivity to bright lights, loud noises, or smells.  Before you get a migraine headache, you may get warning signs (an aura). An aura may include:  Seeing flashing lights or having blind spots.  Seeing bright spots, halos, or zigzag lines.  Having tunnel vision or blurred vision.  Having numbness or a tingling feeling.  Having trouble talking.  Having muscle weakness.  Some people have symptoms after a migraine headache (postdromal phase), such as:  Feeling tired.  Difficulty concentrating.  How is this diagnosed?  A migraine headache can be diagnosed based on:  Your symptoms.  A physical exam.  Tests, such as:  CT scan or an MRI of the head. These imaging tests can help rule out other causes of headaches.  Taking fluid from the spine (lumbar puncture) and analyzing it (cerebrospinal fluid analysis, or CSF analysis).  How is this treated?  This condition may be treated with medicines that:  Relieve pain.  Relieve nausea.  Prevent migraine headaches.  Treatment for this condition may also include:  Acupuncture.  Lifestyle changes like avoiding foods that trigger migraine headaches.  Biofeedback.  Cognitive behavioral therapy.  Follow these instructions at home:  Medicines    Take over-the-counter and prescription medicines only as told by your health care provider.  Ask your health care provider if the medicine prescribed to you:  Requires you to avoid driving or using heavy machinery.  Can cause constipation. You may need to take these actions to prevent or treat constipation:  Drink enough fluid to keep your urine pale yellow.  Take over-the-counter or prescription medicines.  Eat foods that are high in fiber, such as beans, whole grains, and fresh fruits and vegetables.  Limit foods that are high in fat and processed sugars, such as fried or sweet foods.  Lifestyle    Do not drink alcohol.  Do not use any products that contain nicotine or tobacco, such as cigarettes, e-cigarettes, and chewing tobacco. If you need help quitting, ask your health care provider.  Get at least 8 hours of sleep every night.  Find ways to manage stress, such as meditation, deep breathing, or yoga.  General instructions    Keep a journal to find out what may trigger your migraine headaches. For example, write down:  What you eat and drink.  How much sleep you get.  Any change to your diet or medicines.  If you have a migraine headache:  Avoid things that make your symptoms worse, such as bright lights.  It may help to lie down in a dark, quiet room.  Do not drive or use heavy machinery.  Ask your health care provider what activities are safe for you while you are experiencing symptoms.  Keep all follow-up visits as told by your health care provider. This is important.  Contact a health care provider if:  You develop symptoms that are different or more severe than your usual migraine headache symptoms.  You have more than 15 headache days in one month.  Get help right away if:  Your migraine headache becomes severe.  Your migraine headache lasts longer than 72 hours.  You have a fever.  You have a stiff neck.  You have vision loss.  Your muscles feel weak or like you cannot control them.  You start to lose your balance often.  You have trouble walking.  You faint.  You have a seizure.  Summary  A migraine headache is an intense, throbbing pain on one side or both sides of the head. Migraines may also cause other symptoms, such as nausea, vomiting, and sensitivity to light and noise.  This condition may be treated with medicines and lifestyle changes. You may also need to avoid certain things that trigger a migraine headache.  Keep a journal to find out what may trigger your migraine headaches.  Contact your health care provider if you have more than 15 headache days in a month or you develop symptoms that are different or more severe than your usual migraine headache symptoms.  This information is not intended to replace advice given to you by your health care provider. Make sure you discuss any questions you have with your health care provider.    Document Revised: 06/01/2023 Document Reviewed: 01/30/2020

## 2025-08-22 ENCOUNTER — TRANSCRIPTION ENCOUNTER (OUTPATIENT)
Age: 40
End: 2025-08-22

## 2025-08-26 ENCOUNTER — NON-APPOINTMENT (OUTPATIENT)
Age: 40
End: 2025-08-26